# Patient Record
Sex: FEMALE | Race: WHITE | NOT HISPANIC OR LATINO | URBAN - METROPOLITAN AREA
[De-identification: names, ages, dates, MRNs, and addresses within clinical notes are randomized per-mention and may not be internally consistent; named-entity substitution may affect disease eponyms.]

---

## 2019-03-11 ENCOUNTER — EMERGENCY (EMERGENCY)
Facility: HOSPITAL | Age: 24
LOS: 1 days | Discharge: HOME | End: 2019-03-11
Attending: EMERGENCY MEDICINE | Admitting: EMERGENCY MEDICINE
Payer: COMMERCIAL

## 2019-03-11 VITALS
OXYGEN SATURATION: 100 % | SYSTOLIC BLOOD PRESSURE: 122 MMHG | DIASTOLIC BLOOD PRESSURE: 78 MMHG | RESPIRATION RATE: 16 BRPM | HEART RATE: 89 BPM

## 2019-03-11 VITALS
SYSTOLIC BLOOD PRESSURE: 126 MMHG | OXYGEN SATURATION: 97 % | RESPIRATION RATE: 18 BRPM | DIASTOLIC BLOOD PRESSURE: 73 MMHG | TEMPERATURE: 98 F | HEART RATE: 103 BPM | WEIGHT: 125 LBS

## 2019-03-11 LAB
ALBUMIN SERPL ELPH-MCNC: 4.4 G/DL — SIGNIFICANT CHANGE UP (ref 3.3–5)
ALP SERPL-CCNC: 61 U/L — SIGNIFICANT CHANGE UP (ref 40–120)
ALT FLD-CCNC: 15 U/L — SIGNIFICANT CHANGE UP (ref 10–45)
ANION GAP SERPL CALC-SCNC: 10 MMOL/L — SIGNIFICANT CHANGE UP (ref 5–17)
AST SERPL-CCNC: 20 U/L — SIGNIFICANT CHANGE UP (ref 10–40)
BASOPHILS # BLD AUTO: 0.02 K/UL — SIGNIFICANT CHANGE UP (ref 0–0.2)
BASOPHILS NFR BLD AUTO: 0.5 % — SIGNIFICANT CHANGE UP (ref 0–2)
BILIRUB SERPL-MCNC: 0.5 MG/DL — SIGNIFICANT CHANGE UP (ref 0.2–1.2)
BUN SERPL-MCNC: 10 MG/DL — SIGNIFICANT CHANGE UP (ref 7–23)
CALCIUM SERPL-MCNC: 9.6 MG/DL — SIGNIFICANT CHANGE UP (ref 8.4–10.5)
CHLORIDE SERPL-SCNC: 105 MMOL/L — SIGNIFICANT CHANGE UP (ref 96–108)
CO2 SERPL-SCNC: 23 MMOL/L — SIGNIFICANT CHANGE UP (ref 22–31)
CREAT SERPL-MCNC: 0.78 MG/DL — SIGNIFICANT CHANGE UP (ref 0.5–1.3)
EOSINOPHIL # BLD AUTO: 0.03 K/UL — SIGNIFICANT CHANGE UP (ref 0–0.5)
EOSINOPHIL NFR BLD AUTO: 0.8 % — SIGNIFICANT CHANGE UP (ref 0–6)
GLUCOSE SERPL-MCNC: 96 MG/DL — SIGNIFICANT CHANGE UP (ref 70–99)
HCT VFR BLD CALC: 42.6 % — SIGNIFICANT CHANGE UP (ref 34.5–45)
HGB BLD-MCNC: 14.1 G/DL — SIGNIFICANT CHANGE UP (ref 11.5–15.5)
IMM GRANULOCYTES NFR BLD AUTO: 0.3 % — SIGNIFICANT CHANGE UP (ref 0–1.5)
LIDOCAIN IGE QN: 21 U/L — SIGNIFICANT CHANGE UP (ref 7–60)
LYMPHOCYTES # BLD AUTO: 0.74 K/UL — LOW (ref 1–3.3)
LYMPHOCYTES # BLD AUTO: 19.4 % — SIGNIFICANT CHANGE UP (ref 13–44)
MCHC RBC-ENTMCNC: 29.7 PG — SIGNIFICANT CHANGE UP (ref 27–34)
MCHC RBC-ENTMCNC: 33.1 GM/DL — SIGNIFICANT CHANGE UP (ref 32–36)
MCV RBC AUTO: 89.9 FL — SIGNIFICANT CHANGE UP (ref 80–100)
MONOCYTES # BLD AUTO: 0.56 K/UL — SIGNIFICANT CHANGE UP (ref 0–0.9)
MONOCYTES NFR BLD AUTO: 14.7 % — HIGH (ref 2–14)
NEUTROPHILS # BLD AUTO: 2.45 K/UL — SIGNIFICANT CHANGE UP (ref 1.8–7.4)
NEUTROPHILS NFR BLD AUTO: 64.3 % — SIGNIFICANT CHANGE UP (ref 43–77)
NRBC # BLD: 0 /100 WBCS — SIGNIFICANT CHANGE UP (ref 0–0)
PLATELET # BLD AUTO: 205 K/UL — SIGNIFICANT CHANGE UP (ref 150–400)
POTASSIUM SERPL-MCNC: 4.6 MMOL/L — SIGNIFICANT CHANGE UP (ref 3.5–5.3)
POTASSIUM SERPL-SCNC: 4.6 MMOL/L — SIGNIFICANT CHANGE UP (ref 3.5–5.3)
PROT SERPL-MCNC: 7.6 G/DL — SIGNIFICANT CHANGE UP (ref 6–8.3)
RBC # BLD: 4.74 M/UL — SIGNIFICANT CHANGE UP (ref 3.8–5.2)
RBC # FLD: 12.4 % — SIGNIFICANT CHANGE UP (ref 10.3–14.5)
SODIUM SERPL-SCNC: 138 MMOL/L — SIGNIFICANT CHANGE UP (ref 135–145)
WBC # BLD: 3.81 K/UL — SIGNIFICANT CHANGE UP (ref 3.8–10.5)
WBC # FLD AUTO: 3.81 K/UL — SIGNIFICANT CHANGE UP (ref 3.8–10.5)

## 2019-03-11 PROCEDURE — 36415 COLL VENOUS BLD VENIPUNCTURE: CPT

## 2019-03-11 PROCEDURE — 99284 EMERGENCY DEPT VISIT MOD MDM: CPT

## 2019-03-11 PROCEDURE — 87086 URINE CULTURE/COLONY COUNT: CPT

## 2019-03-11 PROCEDURE — 99283 EMERGENCY DEPT VISIT LOW MDM: CPT

## 2019-03-11 PROCEDURE — 85025 COMPLETE CBC W/AUTO DIFF WBC: CPT

## 2019-03-11 PROCEDURE — 81001 URINALYSIS AUTO W/SCOPE: CPT

## 2019-03-11 PROCEDURE — 83690 ASSAY OF LIPASE: CPT

## 2019-03-11 PROCEDURE — 80053 COMPREHEN METABOLIC PANEL: CPT

## 2019-03-11 RX ORDER — ONDANSETRON 8 MG/1
1 TABLET, FILM COATED ORAL
Qty: 12 | Refills: 0 | OUTPATIENT
Start: 2019-03-11 | End: 2019-03-14

## 2019-03-11 RX ORDER — SODIUM CHLORIDE 9 MG/ML
1000 INJECTION, SOLUTION INTRAVENOUS
Qty: 0 | Refills: 0 | Status: DISCONTINUED | OUTPATIENT
Start: 2019-03-11 | End: 2019-03-15

## 2019-03-11 RX ADMIN — SODIUM CHLORIDE 500 MILLILITER(S): 9 INJECTION, SOLUTION INTRAVENOUS at 16:58

## 2019-03-11 NOTE — ED ADULT NURSE NOTE - OBJECTIVE STATEMENT
23 year old female patient with c/o of frequent diarrhea with mild abd pain x2days.  DEnies fever/vomitting.  No distress noted.  Breathing eaisly and unlabored.  Recently travelled from [place].  A+OX3, ambulatory w steady gait.

## 2019-03-11 NOTE — ED ADULT NURSE NOTE - CHPI ED NUR SYMPTOMS NEG
no pain/no vomiting/no decreased eating/drinking/no dizziness/no chills/no weakness/no tingling/no fever/no nausea

## 2019-03-11 NOTE — ED PROVIDER NOTE - NSFOLLOWUPINSTRUCTIONS_ED_ALL_ED_FT
follow up regular doctor in 2-3 days    Diarrhea, Adult  Diarrhea is frequent loose and watery bowel movements. Diarrhea can make you feel weak and cause you to become dehydrated. Dehydration can make you tired and thirsty, cause you to have a dry mouth, and decrease how often you urinate. Diarrhea typically lasts 2–3 days. However, it can last longer if it is a sign of something more serious. It is important to treat your diarrhea as told by your health care provider.    Follow these instructions at home:  Eating and drinking     Image Image ImageFollow these recommendations as told by your health care provider:    Take an oral rehydration solution (ORS). This is a drink that is sold at pharmacies and retail stores.  Drink clear fluids, such as water, ice chips, diluted fruit juice, and low-calorie sports drinks.  Eat bland, easy-to-digest foods in small amounts as you are able. These foods include bananas, applesauce, rice, lean meats, toast, and crackers.  Avoid drinking fluids that contain a lot of sugar or caffeine, such as energy drinks, sports drinks, and soda.  Avoid alcohol.  Avoid spicy or fatty foods.    General instructions     Drink enough fluid to keep your urine clear or pale yellow.  Wash your hands often. If soap and water are not available, use hand .  Make sure that all people in your household wash their hands well and often.  Take over-the-counter and prescription medicines only as told by your health care provider.  Rest at home while you recover.  Watch your condition for any changes.  Take a warm bath to relieve any burning or pain from frequent diarrhea episodes.  Keep all follow-up visits as told by your health care provider. This is important.  Contact a health care provider if:  You have a fever.  Your diarrhea gets worse.  You have new symptoms.  You cannot keep fluids down.  You feel light-headed or dizzy.  You have a headache  You have muscle cramps.  Get help right away if:  You have chest pain.  You feel extremely weak or you faint.  You have bloody or black stools or stools that look like tar.  You have severe pain, cramping, or bloating in your abdomen.  You have trouble breathing or you are breathing very quickly.  Your heart is beating very quickly.  Your skin feels cold and clammy.  You feel confused.  You have signs of dehydration, such as:    Dark urine, very little urine, or no urine.  Cracked lips.  Dry mouth.  Sunken eyes.  Sleepiness.  Weakness.    This information is not intended to replace advice given to you by your health care provider. Make sure you discuss any questions you have with your health care provider.    Document Released: 12/08/2003 Document Revised: 08/01/2018 Document Reviewed: 08/23/2016  [employer] Interactive Patient Education © 2019 [employer]

## 2019-03-11 NOTE — ED PROVIDER NOTE - CLINICAL SUMMARY MEDICAL DECISION MAKING FREE TEXT BOX
healthy 22 yo female with 2 days of frequent diarrhea.  no fever or focal pain.  will check labs, give iv fluids.  if labs normal can continue to hydrate, supportive care healthy 22 yo female with 2 days of frequent diarrhea.  no fever or focal pain.  will check labs, give iv fluids.  if labs normal can continue to hydrate, supportive care.  wbc and electrolytes nromal

## 2019-03-11 NOTE — ED PROVIDER NOTE - OBJECTIVE STATEMENT
a few days of frquet diarrhea, like 20 episodes a day.  mild abd discomfort.  no fever.  nausea but no vomiting  denies eating out, sick contacts.  traveled to [place] about 10 days ago but no travel outside of the country  works as a nurse in [employer], but cannot recall a patient recently with diarrhea  normal urination.  able to keep down fluids

## 2019-03-15 DIAGNOSIS — R19.7 DIARRHEA, UNSPECIFIED: ICD-10-CM

## 2019-03-15 DIAGNOSIS — R11.0 NAUSEA: ICD-10-CM

## 2019-03-15 DIAGNOSIS — R10.84 GENERALIZED ABDOMINAL PAIN: ICD-10-CM
